# Patient Record
Sex: MALE
[De-identification: names, ages, dates, MRNs, and addresses within clinical notes are randomized per-mention and may not be internally consistent; named-entity substitution may affect disease eponyms.]

---

## 2018-04-29 ENCOUNTER — HOSPITAL ENCOUNTER (INPATIENT)
Dept: HOSPITAL 31 - C.ER | Age: 53
LOS: 5 days | Discharge: HOME | DRG: 195 | End: 2018-05-04
Attending: INTERNAL MEDICINE | Admitting: INTERNAL MEDICINE
Payer: COMMERCIAL

## 2018-04-29 VITALS — BODY MASS INDEX: 25.6 KG/M2

## 2018-04-29 DIAGNOSIS — J18.9: Primary | ICD-10-CM

## 2018-04-29 DIAGNOSIS — Z23: ICD-10-CM

## 2018-04-29 LAB
ALBUMIN SERPL-MCNC: 3.7 G/DL (ref 3.5–5)
ALBUMIN/GLOB SERPL: 1.2 {RATIO} (ref 1–2.1)
ALT SERPL-CCNC: 38 U/L (ref 21–72)
AST SERPL-CCNC: 64 U/L (ref 17–59)
BASOPHILS # BLD AUTO: 0 K/UL (ref 0–0.2)
BASOPHILS NFR BLD: 0.5 % (ref 0–2)
BUN SERPL-MCNC: 6 MG/DL (ref 9–20)
CALCIUM SERPL-MCNC: 8.7 MG/DL (ref 8.6–10.4)
EOSINOPHIL # BLD AUTO: 0 K/UL (ref 0–0.7)
EOSINOPHIL NFR BLD: 0.4 % (ref 0–4)
ERYTHROCYTE [DISTWIDTH] IN BLOOD BY AUTOMATED COUNT: 12.5 % (ref 11.5–14.5)
GFR NON-AFRICAN AMERICAN: > 60
HGB BLD-MCNC: 14.4 G/DL (ref 12–18)
LYMPHOCYTES # BLD AUTO: 1.8 K/UL (ref 1–4.3)
LYMPHOCYTES NFR BLD AUTO: 24.1 % (ref 20–40)
MCH RBC QN AUTO: 31.5 PG (ref 27–31)
MCHC RBC AUTO-ENTMCNC: 35.1 G/DL (ref 33–37)
MCV RBC AUTO: 89.7 FL (ref 80–94)
MONOCYTES # BLD: 0.8 K/UL (ref 0–0.8)
MONOCYTES NFR BLD: 11 % (ref 0–10)
NEUTROPHILS # BLD: 4.9 K/UL (ref 1.8–7)
NEUTROPHILS NFR BLD AUTO: 64 % (ref 50–75)
NRBC BLD AUTO-RTO: 0.1 % (ref 0–2)
PLATELET # BLD: 202 K/UL (ref 130–400)
PMV BLD AUTO: 9.2 FL (ref 7.2–11.7)
RBC # BLD AUTO: 4.58 MIL/UL (ref 4.4–5.9)
WBC # BLD AUTO: 7.6 K/UL (ref 4.8–10.8)

## 2018-04-29 RX ADMIN — METHYLPREDNISOLONE SODIUM SUCCINATE SCH MG: 40 INJECTION, POWDER, FOR SOLUTION INTRAMUSCULAR; INTRAVENOUS at 18:33

## 2018-04-29 RX ADMIN — IPRATROPIUM BROMIDE AND ALBUTEROL SULFATE SCH ML: .5; 3 SOLUTION RESPIRATORY (INHALATION) at 20:53

## 2018-04-29 NOTE — RAD
HISTORY:

Cough and fever 



COMPARISON:

No prior.



TECHNIQUE:

Chest PA and lateral



FINDINGS:



LUNGS:

Bibasilar atelectasis -scarring and/or lower lobe infiltrates



PLEURA:

No significant pleural effusion identified. No pneumothorax apparent.



CARDIOVASCULAR:

Normal.



OSSEOUS STRUCTURES:

No significant abnormalities.



VISUALIZED UPPER ABDOMEN:

Normal.



OTHER FINDINGS:

None.



IMPRESSION:

Bibasilar atelectasis -scarring and/or lower lobe infiltrates

## 2018-04-29 NOTE — CP.PCM.HP
Past Patient History





- Past Social History


Smoking Status: Never Smoked





- PSYCHIATRIC


Hx Substance Use: No





- SURGICAL HISTORY


Hx Surgeries: Yes





Meds


Allergies/Adverse Reactions: 


 Allergies











Allergy/AdvReac Type Severity Reaction Status Date / Time


 


No Known Allergies Allergy   Verified 04/29/18 10:10














Results





- Vital Signs


Recent Vital Signs: 





 Last Vital Signs











Temp  98 F   04/29/18 16:12


 


Pulse  106 H  04/29/18 16:12


 


Resp  20   04/29/18 16:12


 


BP  111/76   04/29/18 16:12


 


Pulse Ox  96   04/29/18 17:17














- Labs


Result Diagrams: 


 04/29/18 11:46





 04/29/18 11:46


Labs: 





 Laboratory Results - last 24 hr











  04/29/18 04/29/18 04/29/18





  11:46 11:46 11:46


 


WBC  7.6  


 


RBC  4.58  


 


Hgb  14.4  


 


Hct  41.1  


 


MCV  89.7  


 


MCH  31.5 H  


 


MCHC  35.1  


 


RDW  12.5  


 


Plt Count  202  


 


MPV  9.2  


 


Neut % (Auto)  64.0  


 


Lymph % (Auto)  24.1  


 


Mono % (Auto)  11.0 H  


 


Eos % (Auto)  0.4  


 


Baso % (Auto)  0.5  


 


Neut # (Auto)  4.9  


 


Lymph # (Auto)  1.8  


 


Mono # (Auto)  0.8  


 


Eos # (Auto)  0.0  


 


Baso # (Auto)  0.0  


 


Sodium   141 


 


Potassium   3.9 


 


Chloride   105 


 


Carbon Dioxide   25 


 


Anion Gap   14 


 


BUN   6 L 


 


Creatinine   0.7 L 


 


Est GFR ( Amer)   > 60 


 


Est GFR (Non-Af Amer)   > 60 


 


Random Glucose   86 


 


Lactic Acid    1.3


 


Calcium   8.7 


 


Total Bilirubin   0.6 


 


AST   64 H 


 


ALT   38 


 


Alkaline Phosphatase   73 


 


Total Protein   6.7 


 


Albumin   3.7 


 


Globulin   3.0 


 


Albumin/Globulin Ratio   1.2 


 


Ur L.pneumophila Ag   














  04/29/18





  12:12


 


WBC 


 


RBC 


 


Hgb 


 


Hct 


 


MCV 


 


MCH 


 


MCHC 


 


RDW 


 


Plt Count 


 


MPV 


 


Neut % (Auto) 


 


Lymph % (Auto) 


 


Mono % (Auto) 


 


Eos % (Auto) 


 


Baso % (Auto) 


 


Neut # (Auto) 


 


Lymph # (Auto) 


 


Mono # (Auto) 


 


Eos # (Auto) 


 


Baso # (Auto) 


 


Sodium 


 


Potassium 


 


Chloride 


 


Carbon Dioxide 


 


Anion Gap 


 


BUN 


 


Creatinine 


 


Est GFR ( Amer) 


 


Est GFR (Non-Af Amer) 


 


Random Glucose 


 


Lactic Acid 


 


Calcium 


 


Total Bilirubin 


 


AST 


 


ALT 


 


Alkaline Phosphatase 


 


Total Protein 


 


Albumin 


 


Globulin 


 


Albumin/Globulin Ratio 


 


Ur L.pneumophila Ag  Negative

## 2018-04-29 NOTE — C.PDOC
History Of Present Illness


51yo male, presents to ED with complaints of persistent coughing for the past 

week with associated diarrhea. He was seen and evaluated at [facility] and given levoquin, tessalon perles. Patient followed up with his PMD 

who changed levoquin to Cippro which the patient has been taking with no relief 

of symptoms. He states he is unable to sleep due to persistent coughing, and 

has decreased appetite as well. he has no other complaitns. 


Time Seen by Provider: 04/29/18 10:26


Chief Complaint (Nursing): Shortness Of Breath


History Per: Patient


History/Exam Limitations: no limitations


Onset/Duration Of Symptoms: Days


Current Symptoms Are (Timing): Still Present


Reports Recently: Seen In ED, Treated By A Physician





Past Medical History


Reviewed: Historical Data, Nursing Documentation, Vital Signs


Vital Signs: 


 Last Vital Signs











Temp  98 F   04/29/18 16:12


 


Pulse  106 H  04/29/18 16:12


 


Resp  20   04/29/18 16:12


 


BP  111/76   04/29/18 16:12


 


Pulse Ox  96   04/29/18 16:12














- Medical History


PMH: No Chronic Diseases


Surgical History: Back Surgery


Family History: States: No Known Family Hx





- Social History


Hx Tobacco Use: No


Hx Alcohol Use: No


Hx Substance Use: No





- Immunization History


Hx Tetanus Toxoid Vaccination: No


Hx Influenza Vaccination: No


Hx Pneumococcal Vaccination: No





Review Of Systems


Except As Marked, All Systems Reviewed And Found Negative.


Constitutional: Negative for: Fever, Chills


Cardiovascular: Negative for: Chest Pain


Respiratory: Positive for: Cough.  Negative for: Shortness of Breath


Gastrointestinal: Positive for: Diarrhea.  Negative for: Nausea, Vomiting, 

Abdominal Pain





Physical Exam





- Physical Exam


Appears: Non-toxic, No Acute Distress


Skin: Normal Color, Warm, Diaphoretic


Head: Atraumatic, Normacephalic


Eye(s): bilateral: Normal Inspection, PERRL


Oral Mucosa: Moist


Throat: Normal, No Erythema


Neck: Normal ROM, Supple


Chest: Symmetrical


Cardiovascular: Rhythm Regular


Respiratory: Other (crackles noted to right lower lobe)


Gastrointestinal/Abdominal: Normal Exam, Soft, No Tenderness


Back: Normal Inspection


Extremity: Normal ROM, No Pedal Edema, No Deformity


Neurological/Psych: Oriented x3





ED Course And Treatment





- Laboratory Results


Result Diagrams: 


 04/29/18 11:46





 04/29/18 11:46


O2 Sat by Pulse Oximetry: 96 (RA)


Pulse Ox Interpretation: Normal





- Radiology


CXR: Read By Radiologist


CXR Interpretation: Yes: Infiltrates (right lower lobe)





- Other Rad


  ** CXR


X-Ray: Viewed By Me, Read By Radiologist


Interpretation: Accession No. : [id number].  Patient Name / ID : ELLIE SIN S  / [id number].  Exam Date : 04/29/2018 10:29:01 ( Approved ).  Study 

Comment :  Sex / Age : M  / 52Y.  Creator : Mike Kate MD.  Dictator :

   :  Approver : Mike Kate MD.  Approver2 :  Report Date : 

04/29/2018 10:47:51.  My Comment :  ********************************************

***************************************.  HISTORY:  Cough and fever.  COMPARISON

:  No prior.  TECHNIQUE:  Chest PA and lateral.  FINDINGS:  LUNGS:  Bibasilar 

atelectasis -scarring and/or lower lobe infiltrates.  PLEURA:  No significant 

pleural effusion identified. No pneumothorax apparent.  CARDIOVASCULAR:  

Normal.  OSSEOUS STRUCTURES:  No significant abnormalities.  VISUALIZED UPPER 

ABDOMEN:  Normal.  OTHER FINDINGS:  None.  IMPRESSION:  Bibasilar atelectasis -

scarring and/or lower lobe infiltrates


Progress Note: Labs and CXR ordered.  11:20 CXR reviewed, and patient has 

possible penumonia in right lower lobe.  Patient given Albuterol, IV Rocephin, 

Duoneb, Phenergan, Solumedrol and Zithromax.  15:35 Case disucssed with Dr. NENO Mahan who will admit patient for further treatment.





Disposition





- Disposition


Disposition: HOSPITALIZED


Disposition Time: 15:37


Condition: FAIR





- Clinical Impression


Clinical Impression: 


 Pneumonia








- PA / NP / Resident Statement


MD/DO has reviewed & agrees with the documentation as recorded.





- Scribe Statement


The provider has reviewed the documentation as recorded by the Scribe (Naya Maddox)


Provider Attestation:


All medical record entries made by the Scribe were at my direction and 

personally dictated by me. I have reviewed the chart and agree that the record 

accurately reflects my personal performance of the history, physical exam, 

medical decision making, and the department course for this patient. I have 

also personally directed, reviewed, and agree with the discharge instructions 

and disposition.





Decision To Admit





- Pt Status Changed To:


Hospital Disposition Of: Inpatient





- Admit Certification


Admit to Inpatient:: After my assessment, the patient will require 

hospitalization for at least two midnights.  This is because of the severity of 

symptoms shown, intensity of services needed, and/or the medical risk in this 

patient being treated as an outpatient.





- InPatient:


Physician Admission Certification: I certify that this patient requires 2 or 

more midnights of care for the following reason:: needs IV antibiotics, failed 

outpatient treatment





- .


Bed Request Type: Regular


Admitting Physician: Tony Mahan


Patient Diagnosis: 


 Pneumonia

## 2018-04-30 LAB
ALBUMIN SERPL-MCNC: 3.8 G/DL (ref 3.5–5)
ALBUMIN/GLOB SERPL: 1.3 {RATIO} (ref 1–2.1)
ALT SERPL-CCNC: 36 U/L (ref 21–72)
AST SERPL-CCNC: 53 U/L (ref 17–59)
BASOPHILS # BLD AUTO: 0 K/UL (ref 0–0.2)
BASOPHILS NFR BLD: 0.2 % (ref 0–2)
BUN SERPL-MCNC: 12 MG/DL (ref 9–20)
CALCIUM SERPL-MCNC: 9.1 MG/DL (ref 8.6–10.4)
EOSINOPHIL # BLD AUTO: 0 K/UL (ref 0–0.7)
EOSINOPHIL NFR BLD: 0 % (ref 0–4)
ERYTHROCYTE [DISTWIDTH] IN BLOOD BY AUTOMATED COUNT: 12.7 % (ref 11.5–14.5)
GFR NON-AFRICAN AMERICAN: > 60
HGB BLD-MCNC: 13.7 G/DL (ref 12–18)
LYMPHOCYTES # BLD AUTO: 0.7 K/UL (ref 1–4.3)
LYMPHOCYTES NFR BLD AUTO: 13.2 % (ref 20–40)
MCH RBC QN AUTO: 31.9 PG (ref 27–31)
MCHC RBC AUTO-ENTMCNC: 35.8 G/DL (ref 33–37)
MCV RBC AUTO: 89.1 FL (ref 80–94)
MONOCYTES # BLD: 0.7 K/UL (ref 0–0.8)
MONOCYTES NFR BLD: 12.4 % (ref 0–10)
NEUTROPHILS # BLD: 4 K/UL (ref 1.8–7)
NEUTROPHILS NFR BLD AUTO: 74.2 % (ref 50–75)
NRBC BLD AUTO-RTO: 0.1 % (ref 0–2)
PLATELET # BLD: 234 K/UL (ref 130–400)
PMV BLD AUTO: 9 FL (ref 7.2–11.7)
RBC # BLD AUTO: 4.3 MIL/UL (ref 4.4–5.9)
WBC # BLD AUTO: 5.4 K/UL (ref 4.8–10.8)

## 2018-04-30 RX ADMIN — METHYLPREDNISOLONE SODIUM SUCCINATE SCH MG: 40 INJECTION, POWDER, FOR SOLUTION INTRAMUSCULAR; INTRAVENOUS at 13:41

## 2018-04-30 RX ADMIN — IPRATROPIUM BROMIDE AND ALBUTEROL SULFATE SCH ML: .5; 3 SOLUTION RESPIRATORY (INHALATION) at 20:06

## 2018-04-30 RX ADMIN — METHYLPREDNISOLONE SODIUM SUCCINATE SCH MG: 40 INJECTION, POWDER, FOR SOLUTION INTRAMUSCULAR; INTRAVENOUS at 05:27

## 2018-04-30 RX ADMIN — IPRATROPIUM BROMIDE AND ALBUTEROL SULFATE SCH ML: .5; 3 SOLUTION RESPIRATORY (INHALATION) at 08:04

## 2018-04-30 RX ADMIN — IPRATROPIUM BROMIDE AND ALBUTEROL SULFATE SCH ML: .5; 3 SOLUTION RESPIRATORY (INHALATION) at 01:12

## 2018-04-30 RX ADMIN — METHYLPREDNISOLONE SODIUM SUCCINATE SCH MG: 40 INJECTION, POWDER, FOR SOLUTION INTRAMUSCULAR; INTRAVENOUS at 21:15

## 2018-04-30 RX ADMIN — IPRATROPIUM BROMIDE AND ALBUTEROL SULFATE SCH: .5; 3 SOLUTION RESPIRATORY (INHALATION) at 13:47

## 2018-04-30 RX ADMIN — ENOXAPARIN SODIUM SCH MG: 40 INJECTION SUBCUTANEOUS at 09:37

## 2018-04-30 RX ADMIN — CEFTRIAXONE SCH MLS/HR: 1 INJECTION, SOLUTION INTRAVENOUS at 09:37

## 2018-04-30 NOTE — CP.PCM.PN
Subjective





- Date & Time of Evaluation


Date of Evaluation: 04/30/18


Time of Evaluation: 09:20





- Subjective


Subjective: 


clinically same





Objective





- Vital Signs/Intake and Output


Vital Signs (last 24 hours): 


 











Temp Pulse Resp BP Pulse Ox


 


 98.3 F   95 H  18   104/68   98 


 


 04/30/18 15:00  04/30/18 15:00  04/30/18 15:00  04/30/18 15:00  04/30/18 15:00








Intake and Output: 


 











 04/30/18 04/30/18





 06:59 18:59


 


Intake Total 0 960


 


Balance 0 960














- Medications


Medications: 


 Current Medications





Albuterol/Ipratropium (Duoneb 3 Mg/0.5 Mg (3 Ml) Ud)  3 ml INH RQ6 [facility]


   Last Admin: 04/30/18 13:47 Dose:  Not Given


Benzonatate (Tessalon Perles)  200 mg PO BID [facility]


   Last Admin: 04/30/18 09:38 Dose:  200 mg


Enoxaparin Sodium (Lovenox)  40 mg SC DAILY [facility]


   Last Admin: 04/30/18 09:37 Dose:  40 mg


Ceftriaxone Sodium (Rocephin Iv 1 Gm Duplex)  50 mls @ 100 mls/hr IVPB DAILY [facility]


   PRN Reason: Protocol


   Stop: 05/10/18 10:01


   Last Admin: 04/30/18 09:37 Dose:  100 mls/hr


Azithromycin 500 mg/ Sodium (Chloride)  250 mls @ 167 mls/hr IVPB DAILY [facility]


   PRN Reason: Protocol


   Last Admin: 04/30/18 11:25 Dose:  167 mls/hr


Methylprednisolone (Solu-Medrol)  40 mg IVP Q12 [facility]


Pneumococcal Polyvalent Vaccine (Pneumovax 23 Vaccine)  0.5 ml IM .ONCE ONE


   Stop: 05/01/18 10:01











- Labs


Labs: 


 





 04/30/18 11:41 





 04/30/18 11:41 











- Constitutional


Appears: Well





- Head Exam


Head Exam: ATRAUMATIC, NORMAL INSPECTION, NORMOCEPHALIC





- Eye Exam


Eye Exam: EOMI, Normal appearance, PERRL


Pupil Exam: NORMAL ACCOMODATION, PERRL





- ENT Exam


ENT Exam: Mucous Membranes Moist, Normal Exam





- Neck Exam


Neck Exam: Full ROM, Normal Inspection.  absent: Lymphadenopathy





- Respiratory Exam


Respiratory Exam: Decreased Breath Sounds





- Cardiovascular Exam


Cardiovascular Exam: REGULAR RHYTHM, +S1, +S2





- GI/Abdominal Exam


GI & Abdominal Exam: Soft, Diminished Bowel Sounds





- Rectal Exam


Rectal Exam: Deferred

## 2018-04-30 NOTE — CP.PCM.PN
<Liat Hand - Last Filed: 04/30/18 16:15>





Subjective





- Date & Time of Evaluation


Date of Evaluation: 04/30/18


Time of Evaluation: 16:11





- Subjective


Subjective: 





PGy2 progress note for Dr. Mahan





52 year old male with no past medical history is admitted for pneumonia.  Pt 

seen and examined at bedside.  No acute events overnight.  Patient is resting 

comfortablly currently off the nasal canula.  patient denies having any SOB, CP

, abd pain, N/V/D/C, F/C.  Patient is still c/o cough that's non-productive.    





Objective





- Vital Signs/Intake and Output


Vital Signs (last 24 hours): 


 











Temp Pulse Resp BP Pulse Ox


 


 98.3 F   95 H  18   104/68   98 


 


 04/30/18 15:00  04/30/18 15:00  04/30/18 15:00  04/30/18 15:00  04/30/18 15:00








Intake and Output: 


 











 04/30/18 04/30/18





 06:59 18:59


 


Intake Total 0 960


 


Balance 0 960














- Medications


Medications: 


 Current Medications





Albuterol/Ipratropium (Duoneb 3 Mg/0.5 Mg (3 Ml) Ud)  3 ml INH RQ6 [facility]


   Last Admin: 04/30/18 13:47 Dose:  Not Given


Benzonatate (Tessalon Perles)  200 mg PO BID [facility]


   Last Admin: 04/30/18 09:38 Dose:  200 mg


Enoxaparin Sodium (Lovenox)  40 mg SC DAILY [facility]


   Last Admin: 04/30/18 09:37 Dose:  40 mg


Ceftriaxone Sodium (Rocephin Iv 1 Gm Duplex)  50 mls @ 100 mls/hr IVPB DAILY [facility]


   PRN Reason: Protocol


   Stop: 05/10/18 10:01


   Last Admin: 04/30/18 09:37 Dose:  100 mls/hr


Azithromycin 500 mg/ Sodium (Chloride)  250 mls @ 167 mls/hr IVPB DAILY [facility]


   PRN Reason: Protocol


   Last Admin: 04/30/18 11:25 Dose:  167 mls/hr


Methylprednisolone (Solu-Medrol)  40 mg IVP Q8 [facility]


   Last Admin: 04/30/18 13:41 Dose:  40 mg


Pneumococcal Polyvalent Vaccine (Pneumovax 23 Vaccine)  0.5 ml IM .ONCE ONE


   Stop: 05/01/18 10:01











- Labs


Labs: 


 





 04/30/18 11:41 





 04/30/18 11:41 











- Constitutional


Appears: Non-toxic, No Acute Distress





- Head Exam


Head Exam: ATRAUMATIC





- ENT Exam


ENT Exam: Mucous Membranes Moist





- Respiratory Exam


Respiratory Exam: Rhonchi.  absent: Accessory Muscle Use, Rales, Wheezes, 

Respiratory Distress





- Cardiovascular Exam


Cardiovascular Exam: REGULAR RHYTHM, +S1, +S2.  absent: Gallop, Rubs, Murmur





- GI/Abdominal Exam


GI & Abdominal Exam: Soft, Normal Bowel Sounds.  absent: Distended, Firm, 

Guarding, Rigid, Tenderness, Organomegaly





- Extremities Exam


Extremities Exam: absent: Pedal Edema, Tenderness





- Neurological Exam


Neurological Exam: Alert, Awake, Oriented x3





- Psychiatric Exam


Psychiatric exam: Normal Affect, Normal Mood





- Skin


Skin Exam: Dry, Intact, Normal Color, Warm





Assessment and Plan





- Assessment and Plan (Free Text)


Assessment: 





52 year old male with no significant past medical history is admitted for PNA





PNA


- Chest x ray on admission showed bibasilar atelectasis- scarring and/or lower 

lobe infiltrates 


- Afebrile and normal WBC count on admission


- Legionella negative. Blood cultures negative


- Pt started on rocephin and zithromax


- Continue solumedrol 40 mg IV q12


- Duoneb q6 [facility]


- Tessalon perles prn





Hyperglycemia


- will check Hgba1c and lipid panel





Prophylaxis


- lovenox 


- SCDs





Case discussed with Dr. Mahan.  All orders and management per Dr. Mahan





<Tony Mahan - Last Filed: 05/04/18 11:50>





Objective





- Vital Signs/Intake and Output


Vital Signs (last 24 hours): 


 











Temp Pulse Resp BP Pulse Ox


 


 98.4 F   74   18   108/70   96 


 


 05/04/18 07:25  05/04/18 07:25  05/04/18 07:25  05/04/18 07:25  05/04/18 07:25








Intake and Output: 


 











 05/04/18 05/04/18





 06:59 18:59


 


Intake Total 218 


 


Balance 218 














- Medications


Medications: 


 Current Medications





Albuterol/Ipratropium (Duoneb 3 Mg/0.5 Mg (3 Ml) Ud)  3 ml INH RQ6 [facility]


   Last Admin: 05/04/18 07:35 Dose:  3 ml


Benzonatate (Tessalon Perles)  200 mg PO BID [facility]


   Last Admin: 05/04/18 09:41 Dose:  200 mg


Enoxaparin Sodium (Lovenox)  40 mg SC DAILY [facility]


   Last Admin: 05/04/18 09:40 Dose:  40 mg


Ceftriaxone Sodium (Rocephin Iv 1 Gm Duplex)  50 mls @ 100 mls/hr IVPB DAILY [facility]


   PRN Reason: Protocol


   Stop: 05/10/18 10:01


   Last Admin: 05/04/18 09:41 Dose:  100 mls/hr


Methylprednisolone (Solu-Medrol)  40 mg IVP DAILY [facility]


   Last Admin: 05/04/18 09:41 Dose:  40 mg











- Labs


Labs: 


 





 05/04/18 06:28 





 05/04/18 06:28 











Attending/Attestation





- Attestation


I have personally seen and examined this patient.: Yes


I have fully participated in the care of the patient.: Yes


I have reviewed all pertinent clinical information, including history, physical 

exam and plan: Yes


Notes (Text): 





case seen and d.w staff and resident, concurred with finding and management..

## 2018-05-01 LAB
ALBUMIN SERPL-MCNC: 3.6 G/DL (ref 3.5–5)
ALBUMIN/GLOB SERPL: 1.3 {RATIO} (ref 1–2.1)
ALT SERPL-CCNC: 54 U/L (ref 21–72)
AST SERPL-CCNC: 58 U/L (ref 17–59)
BASOPHILS # BLD AUTO: 0 K/UL (ref 0–0.2)
BASOPHILS NFR BLD: 0.2 % (ref 0–2)
BUN SERPL-MCNC: 14 MG/DL (ref 9–20)
CALCIUM SERPL-MCNC: 8.9 MG/DL (ref 8.6–10.4)
EOSINOPHIL # BLD AUTO: 0 K/UL (ref 0–0.7)
EOSINOPHIL NFR BLD: 0 % (ref 0–4)
ERYTHROCYTE [DISTWIDTH] IN BLOOD BY AUTOMATED COUNT: 12.4 % (ref 11.5–14.5)
GFR NON-AFRICAN AMERICAN: > 60
HDLC SERPL-MCNC: 29 MG/DL (ref 30–70)
HGB BLD-MCNC: 13.3 G/DL (ref 12–18)
LDLC SERPL-MCNC: 72 MG/DL (ref 0–129)
LYMPHOCYTE: 8 % (ref 20–40)
LYMPHOCYTES # BLD AUTO: 1.3 K/UL (ref 1–4.3)
LYMPHOCYTES NFR BLD AUTO: 8.7 % (ref 20–40)
MCH RBC QN AUTO: 31.4 PG (ref 27–31)
MCHC RBC AUTO-ENTMCNC: 35.3 G/DL (ref 33–37)
MCV RBC AUTO: 89.1 FL (ref 80–94)
MONOCYTE: 5 % (ref 0–10)
MONOCYTES # BLD: 1.2 K/UL (ref 0–0.8)
MONOCYTES NFR BLD: 7.8 % (ref 0–10)
NEUTROPHILS # BLD: 12.6 K/UL (ref 1.8–7)
NEUTROPHILS NFR BLD AUTO: 83.3 % (ref 50–75)
NEUTROPHILS NFR BLD AUTO: 87 % (ref 50–75)
NRBC BLD AUTO-RTO: 0 % (ref 0–2)
PLATELET # BLD EST: NORMAL 10*3/UL
PLATELET # BLD: 276 K/UL (ref 130–400)
PMV BLD AUTO: 8.6 FL (ref 7.2–11.7)
RBC # BLD AUTO: 4.23 MIL/UL (ref 4.4–5.9)
RBC MORPH BLD: NORMAL
TOTAL CELLS COUNTED BLD: 100
WBC # BLD AUTO: 15.1 K/UL (ref 4.8–10.8)

## 2018-05-01 RX ADMIN — IPRATROPIUM BROMIDE AND ALBUTEROL SULFATE SCH ML: .5; 3 SOLUTION RESPIRATORY (INHALATION) at 19:47

## 2018-05-01 RX ADMIN — METHYLPREDNISOLONE SODIUM SUCCINATE SCH MG: 40 INJECTION, POWDER, FOR SOLUTION INTRAMUSCULAR; INTRAVENOUS at 09:21

## 2018-05-01 RX ADMIN — IPRATROPIUM BROMIDE AND ALBUTEROL SULFATE SCH ML: .5; 3 SOLUTION RESPIRATORY (INHALATION) at 08:54

## 2018-05-01 RX ADMIN — ENOXAPARIN SODIUM SCH MG: 40 INJECTION SUBCUTANEOUS at 09:21

## 2018-05-01 RX ADMIN — IPRATROPIUM BROMIDE AND ALBUTEROL SULFATE SCH ML: .5; 3 SOLUTION RESPIRATORY (INHALATION) at 01:19

## 2018-05-01 RX ADMIN — IPRATROPIUM BROMIDE AND ALBUTEROL SULFATE SCH ML: .5; 3 SOLUTION RESPIRATORY (INHALATION) at 13:08

## 2018-05-01 RX ADMIN — METHYLPREDNISOLONE SODIUM SUCCINATE SCH MG: 40 INJECTION, POWDER, FOR SOLUTION INTRAMUSCULAR; INTRAVENOUS at 21:20

## 2018-05-01 RX ADMIN — CEFTRIAXONE SCH MLS/HR: 1 INJECTION, SOLUTION INTRAVENOUS at 09:20

## 2018-05-01 NOTE — CP.PCM.PN
Subjective





- Date & Time of Evaluation


Date of Evaluation: 05/01/18


Time of Evaluation: 09:00





- Subjective


Subjective: 


clinically same





Objective





- Vital Signs/Intake and Output


Vital Signs (last 24 hours): 


 











Temp Pulse Resp BP Pulse Ox


 


 98.1 F   89   20   108/73   94 L


 


 05/01/18 08:18  05/01/18 08:18  05/01/18 08:18  05/01/18 08:18  05/01/18 08:18








Intake and Output: 


 











 05/01/18 05/01/18





 06:59 18:59


 


Intake Total 100 920


 


Balance 100 920














- Medications


Medications: 


 Current Medications





Albuterol/Ipratropium (Duoneb 3 Mg/0.5 Mg (3 Ml) Ud)  [facility]


   Last Admin: 05/01/18 13:08 Dose:  3 ml


Benzonatate (Tessalon Perles)  200 [facility] BID [facility]


   Last Admin: 05/01/18 09:23 Dose:  200 mg


Enoxaparin Sodium (Lovenox)  40 mg SC [facility]ILY [facility]


   Last Admin: 05/01/18 09:21 Dose:  40 mg


Ceftriaxone Sodium (Rocephin Iv 1 Gm Duplex)  50 mls @ 100 mls/hr IVPB [facility]


   PRN Reason: Protocol


   Stop: 05/10/18 10:01


   Last Admin: 05/01/18 09:20 Dose:  100 mls/hr


Azithromycin 500 mg/ Sodium (Chloride)  250 mls @ 167 mls/hr IVPB [facility]ILY [facility]


   PRN Reason: Protocol


   Last Admin: 05/01/18 10:17 Dose:  167 mls/hr


Methylprednisolone (Solu-Medrol)  40 mg IVP Q12 [facility]


   Last Admin: 05/01/18 09:21 Dose:  40 mg











- Labs


Labs: 


 





 05/01/18 07:56 





 05/01/18 07:56 











- Constitutional


Appears: Well





- Head Exam


Head Exam: ATRAUMATIC, NORMAL INSPECTION, NORMOCEPHALIC





- Eye Exam


Eye Exam: EOMI, Normal appearance, PERRL


Pupil Exam: NORMAL ACCOMODATION, PERRL





- ENT Exam


ENT Exam: Mucous Membranes Moist, Normal Exam





- Neck Exam


Neck Exam: Full ROM, Normal Inspection.  absent: Lymphadenopathy





- Respiratory Exam


Respiratory Exam: Decreased Breath Sounds





- Cardiovascular Exam


Cardiovascular Exam: REGULAR RHYTHM, +S1, +S2





- GI/Abdominal Exam


GI & Abdominal Exam: Soft, Diminished Bowel Sounds





- Rectal Exam


Rectal Exam: Deferred

## 2018-05-02 RX ADMIN — METHYLPREDNISOLONE SODIUM SUCCINATE SCH MG: 40 INJECTION, POWDER, FOR SOLUTION INTRAMUSCULAR; INTRAVENOUS at 09:32

## 2018-05-02 RX ADMIN — IPRATROPIUM BROMIDE AND ALBUTEROL SULFATE SCH ML: .5; 3 SOLUTION RESPIRATORY (INHALATION) at 07:25

## 2018-05-02 RX ADMIN — ENOXAPARIN SODIUM SCH MG: 40 INJECTION SUBCUTANEOUS at 09:33

## 2018-05-02 RX ADMIN — CEFTRIAXONE SCH MLS/HR: 1 INJECTION, SOLUTION INTRAVENOUS at 09:33

## 2018-05-02 RX ADMIN — IPRATROPIUM BROMIDE AND ALBUTEROL SULFATE SCH ML: .5; 3 SOLUTION RESPIRATORY (INHALATION) at 01:23

## 2018-05-02 RX ADMIN — IPRATROPIUM BROMIDE AND ALBUTEROL SULFATE SCH ML: .5; 3 SOLUTION RESPIRATORY (INHALATION) at 13:59

## 2018-05-02 RX ADMIN — IPRATROPIUM BROMIDE AND ALBUTEROL SULFATE SCH ML: .5; 3 SOLUTION RESPIRATORY (INHALATION) at 19:45

## 2018-05-02 RX ADMIN — METHYLPREDNISOLONE SODIUM SUCCINATE SCH MG: 40 INJECTION, POWDER, FOR SOLUTION INTRAMUSCULAR; INTRAVENOUS at 21:13

## 2018-05-02 NOTE — CARD
--------------- APPROVED REPORT --------------





EKG Measurement

Heart [id number]

LA [id number]

[id number]

[id number]

[id number]



<Conclusion>

*** Poor data quality, interpretation may be adversely affected

Normal sinus rhythm

Normal ECG

## 2018-05-02 NOTE — CP.PCM.PN
Subjective





- Date & Time of Evaluation


Date of Evaluation: 05/02/18


Time of Evaluation: 09:40





- Subjective


Subjective: 


clinically same





Objective





- Vital Signs/Intake and Output


Vital Signs (last 24 hours): 


 











Temp Pulse Resp BP Pulse Ox


 


 98.4 F   105 H  20   112/72   95 


 


 05/02/18 07:00  05/02/18 07:00  05/02/18 07:00  05/02/18 07:00  05/02/18 07:00








Intake and Output: 


 











 05/02/18 05/02/18





 06:59 18:59


 


Intake Total 100 600


 


Balance 100 600














- Medications


Medications: 


 Current Medications





Albuterol/Ipratropium (Duoneb 3 Mg/0.5 Mg (3 Ml) Ud)  3 ml INH RQ6 [facility]


   Last Admin: 05/02/18 13:59 Dose:  3 ml


Benzonatate (Tessalon Perles)  200 mg PO BID [facility]


   Last Admin: 05/02/18 09:33 Dose:  200 mg


Enoxaparin Sodium (Lovenox)  40 mg SC DAILY [facility]


   Last Admin: 05/02/18 09:33 Dose:  40 mg


Ceftriaxone Sodium (Rocephin Iv 1 Gm Duplex)  50 mls @ 100 mls/hr IVPB DAILY [facility]


   PRN Reason: Protocol


   Stop: 05/10/18 10:01


   Last Admin: 05/02/18 09:33 Dose:  100 mls/hr


Azithromycin 500 mg/ Sodium (Chloride)  250 mls @ 167 mls/hr IVPB DAILY [facility]


   PRN Reason: Protocol


   Last Admin: 05/02/18 10:50 Dose:  167 mls/hr


Methylprednisolone (Solu-Medrol)  40 mg IVP Q12 [facility]


   Last Admin: 05/02/18 09:32 Dose:  40 mg











- Labs


Labs: 


 





 05/01/18 07:56 





 05/01/18 07:56 











- Constitutional


Appears: Well





- Head Exam


Head Exam: ATRAUMATIC, NORMAL INSPECTION, NORMOCEPHALIC





- Eye Exam


Eye Exam: EOMI, Normal appearance, PERRL


Pupil Exam: NORMAL ACCOMODATION, PERRL





- ENT Exam


ENT Exam: Mucous Membranes Moist, Normal Exam





- Neck Exam


Neck Exam: Full ROM, Normal Inspection.  absent: Lymphadenopathy





- Respiratory Exam


Respiratory Exam: Decreased Breath Sounds





- Cardiovascular Exam


Cardiovascular Exam: REGULAR RHYTHM, +S1, +S2





- GI/Abdominal Exam


GI & Abdominal Exam: Soft, Diminished Bowel Sounds





- Rectal Exam


Rectal Exam: Deferred

## 2018-05-03 LAB
ALBUMIN SERPL-MCNC: 3.8 G/DL (ref 3.5–5)
ALBUMIN/GLOB SERPL: 1.4 {RATIO} (ref 1–2.1)
ALT SERPL-CCNC: 46 U/L (ref 21–72)
AST SERPL-CCNC: 38 U/L (ref 17–59)
BASOPHILS # BLD AUTO: 0 K/UL (ref 0–0.2)
BASOPHILS NFR BLD: 0.2 % (ref 0–2)
BUN SERPL-MCNC: 16 MG/DL (ref 9–20)
CALCIUM SERPL-MCNC: 8.9 MG/DL (ref 8.6–10.4)
EOSINOPHIL # BLD AUTO: 0 K/UL (ref 0–0.7)
EOSINOPHIL NFR BLD: 0 % (ref 0–4)
ERYTHROCYTE [DISTWIDTH] IN BLOOD BY AUTOMATED COUNT: 12.5 % (ref 11.5–14.5)
GFR NON-AFRICAN AMERICAN: > 60
HGB BLD-MCNC: 13.7 G/DL (ref 12–18)
LYMPHOCYTES # BLD AUTO: 2.3 K/UL (ref 1–4.3)
LYMPHOCYTES NFR BLD AUTO: 10.2 % (ref 20–40)
MCH RBC QN AUTO: 30.7 PG (ref 27–31)
MCHC RBC AUTO-ENTMCNC: 34.2 G/DL (ref 33–37)
MCV RBC AUTO: 89.8 FL (ref 80–94)
MONOCYTES # BLD: 1.1 K/UL (ref 0–0.8)
MONOCYTES NFR BLD: 4.6 % (ref 0–10)
NEUTROPHILS # BLD: 19.3 K/UL (ref 1.8–7)
NEUTROPHILS NFR BLD AUTO: 85 % (ref 50–75)
NRBC BLD AUTO-RTO: 0 % (ref 0–2)
PLATELET # BLD: 410 K/UL (ref 130–400)
PMV BLD AUTO: 8.3 FL (ref 7.2–11.7)
RBC # BLD AUTO: 4.44 MIL/UL (ref 4.4–5.9)
WBC # BLD AUTO: 22.8 K/UL (ref 4.8–10.8)

## 2018-05-03 RX ADMIN — IPRATROPIUM BROMIDE AND ALBUTEROL SULFATE SCH ML: .5; 3 SOLUTION RESPIRATORY (INHALATION) at 08:07

## 2018-05-03 RX ADMIN — METHYLPREDNISOLONE SODIUM SUCCINATE SCH MG: 40 INJECTION, POWDER, FOR SOLUTION INTRAMUSCULAR; INTRAVENOUS at 13:47

## 2018-05-03 RX ADMIN — ENOXAPARIN SODIUM SCH MG: 40 INJECTION SUBCUTANEOUS at 09:34

## 2018-05-03 RX ADMIN — IPRATROPIUM BROMIDE AND ALBUTEROL SULFATE SCH ML: .5; 3 SOLUTION RESPIRATORY (INHALATION) at 01:18

## 2018-05-03 RX ADMIN — CEFTRIAXONE SCH MLS/HR: 1 INJECTION, SOLUTION INTRAVENOUS at 09:36

## 2018-05-03 RX ADMIN — IPRATROPIUM BROMIDE AND ALBUTEROL SULFATE SCH ML: .5; 3 SOLUTION RESPIRATORY (INHALATION) at 13:19

## 2018-05-03 RX ADMIN — IPRATROPIUM BROMIDE AND ALBUTEROL SULFATE SCH ML: .5; 3 SOLUTION RESPIRATORY (INHALATION) at 19:45

## 2018-05-03 NOTE — CP.PCM.PN
<Allyson Younger - Last Filed: 05/03/18 15:19>





Subjective





- Date & Time of Evaluation


Date of Evaluation: 05/03/18


Time of Evaluation: 07:00





- Subjective


Subjective: 








PGy2 progress note for Dr. Mahan





52 year old male with no past medical history is admitted for pneumonia.  Pt 

seen and examined at bedside.  No acute events overnight.  Patient is resting 

comfortably currently off the nasal canula.  patient denies having any SOB, CP, 

abd pain, N/V/D/C, F/C.  Patient is still c/o cough that's non-productive. He 

feels like he is ready to go home.  





Objective





- Vital Signs/Intake and Output


Vital Signs (last 24 hours): 


 











Temp Pulse Resp BP Pulse Ox


 


 98.4 F   81   18   113/76   96 


 


 05/03/18 07:25  05/03/18 07:25  05/03/18 07:25  05/03/18 07:25  05/03/18 07:25











- Medications


Medications: 


 Current Medications





Albuterol/Ipratropium (Duoneb 3 Mg/0.5 Mg (3 Ml) Ud)  3 ml INH RQ6 [facility]


   Last Admin: 05/03/18 08:07 Dose:  3 ml


Benzonatate (Tessalon Perles)  200 mg PO BID [facility]


   Last Admin: 05/03/18 09:38 Dose:  200 mg


Enoxaparin Sodium (Lovenox)  40 mg SC DAILY [facility]


   Last Admin: 05/03/18 09:34 Dose:  40 mg


Ceftriaxone Sodium (Rocephin Iv 1 Gm Duplex)  50 mls @ 100 mls/hr IVPB DAILY [facility]


   PRN Reason: Protocol


   Stop: 05/10/18 10:01


   Last Admin: 05/03/18 09:36 Dose:  100 mls/hr


Methylprednisolone (Solu-Medrol)  40 mg IVP Q12 [facility]


   Last Admin: 05/02/18 21:13 Dose:  40 mg











- Labs


Labs: 


 





 05/03/18 09:38 





 05/03/18 09:38 











- Constitutional


Appears: Non-toxic, No Acute Distress





- Head Exam


Head Exam: ATRAUMATIC, NORMAL INSPECTION





- Eye Exam


Eye Exam: EOMI


Pupil Exam: NORMAL ACCOMODATION





- ENT Exam


ENT Exam: Mucous Membranes Moist





- Respiratory Exam


Respiratory Exam: Decreased Breath Sounds, NORMAL BREATHING PATTERN.  absent: 

Accessory Muscle Use, Wheezes, Respiratory Distress





- Cardiovascular Exam


Cardiovascular Exam: REGULAR RHYTHM, +S1, +S2





- GI/Abdominal Exam


GI & Abdominal Exam: Soft, Normal Bowel Sounds.  absent: Distended, Firm, 

Guarding, Tenderness





- Extremities Exam


Extremities Exam: Normal Inspection





- Back Exam


Back Exam: NORMAL INSPECTION.  absent: CVA tenderness (L), CVA tenderness (R), 

paraspinal tenderness





- Neurological Exam


Neurological Exam: Alert, Awake, CN II-XII Intact, Oriented x3





- Psychiatric Exam


Psychiatric exam: Normal Affect, Normal Mood





- Skin


Skin Exam: Dry, Intact, Normal Color, Warm





Assessment and Plan





- Assessment and Plan (Free Text)


Assessment: 





Assessment: 





52 year old male with no significant past medical history is admitted for PNA





PNA


- Chest x ray on admission showed bibasilar atelectasis- scarring and/or lower 

lobe infiltrates 


- Afebrile and normal WBC count on admission


- Legionella negative. Blood cultures negative


- Pt started on rocephin and zithromax


- Continue solumedrol 40 mg IV q12


- Duoneb q6 [facility]


- Tessalon perles prn


- Blood cultures negatives





Hyperglycemia


- Hgba1c 5.4





Prophylaxis


- lovenox 


- SCDs





Case discussed with Dr. Mahan.  All orders and management per Dr. Mahan





<Tony Mahan - Last Filed: 05/04/18 11:51>





Objective





- Vital Signs/Intake and Output


Vital Signs (last 24 hours): 


 











Temp Pulse Resp BP Pulse Ox


 


 98.4 F   74   18   108/70   96 


 


 05/04/18 07:25  05/04/18 07:25  05/04/18 07:25  05/04/18 07:25  05/04/18 07:25








Intake and Output: 


 











 05/04/18 05/04/18





 06:59 18:59


 


Intake Total 218 


 


Balance 218 














- Medications


Medications: 


 Current Medications





Albuterol/Ipratropium (Duoneb 3 Mg/0.5 Mg (3 Ml) Ud)  3 ml INH RQ6 [facility]


   Last Admin: 05/04/18 07:35 Dose:  3 ml


Benzonatate (Tessalon Perles)  200 mg PO BID [facility]


   Last Admin: 05/04/18 09:41 Dose:  200 mg


Enoxaparin Sodium (Lovenox)  40 mg SC DAILY [facility]


   Last Admin: 05/04/18 09:40 Dose:  40 mg


Ceftriaxone Sodium (Rocephin Iv 1 Gm Duplex)  50 mls @ 100 mls/hr IVPB DAILY [facility]


   PRN Reason: Protocol


   Stop: 05/10/18 10:01


   Last Admin: 05/04/18 09:41 Dose:  100 mls/hr


Methylprednisolone (Solu-Medrol)  40 mg IVP DAILY [facility]


   Last Admin: 05/04/18 09:41 Dose:  40 mg











- Labs


Labs: 


 





 05/04/18 06:28 





 05/04/18 06:28 











Attending/Attestation





- Attestation


I have personally seen and examined this patient.: Yes


I have fully participated in the care of the patient.: Yes


I have reviewed all pertinent clinical information, including history, physical 

exam and plan: Yes


Notes (Text): 





case seen and d.w staff and resident, concurred with finding and management..

## 2018-05-03 NOTE — CP.PCM.PN
Subjective





- Date & Time of Evaluation


Date of Evaluation: 05/03/18


Time of Evaluation: 10:20





- Subjective


Subjective: 


clinically same





Objective





- Vital Signs/Intake and Output


Vital Signs (last 24 hours): 


 











Temp Pulse Resp BP Pulse Ox


 


 98.3 F   92 H  20   113/70   95 


 


 05/03/18 15:00  05/03/18 15:00  05/03/18 15:00  05/03/18 15:00  05/03/18 15:00








Intake and Output: 


 











 05/03/18 05/04/18





 18:59 06:59


 


Intake Total 600 


 


Balance 600 














- Medications


Medications: 


 Current Medications





Albuterol/Ipratropium (Duoneb 3 Mg/0.5 Mg (3 Ml) Ud)  3 ml INH RQ6 [facility]


   Last Admin: 05/03/18 13:19 Dose:  3 ml


Benzonatate (Tessalon Perles)  200 mg PO BID [facility]


   Last Admin: 05/03/18 17:19 Dose:  200 mg


Enoxaparin Sodium (Lovenox)  40 mg SC DAILY [facility]


   Last Admin: 05/03/18 09:34 Dose:  40 mg


Ceftriaxone Sodium (Rocephin Iv 1 Gm Duplex)  50 mls @ 100 mls/hr IVPB DAILY [facility]


   PRN Reason: Protocol


   Stop: 05/10/18 10:01


   Last Admin: 05/03/18 09:36 Dose:  100 mls/hr


Methylprednisolone (Solu-Medrol)  40 mg IVP DAILY [facility]











- Labs


Labs: 


 





 05/03/18 09:38 





 05/03/18 09:38 











- Constitutional


Appears: Well





- Head Exam


Head Exam: ATRAUMATIC, NORMAL INSPECTION, NORMOCEPHALIC





- Eye Exam


Eye Exam: EOMI, Normal appearance, PERRL


Pupil Exam: NORMAL ACCOMODATION, PERRL





- ENT Exam


ENT Exam: Mucous Membranes Moist, Normal Exam





- Neck Exam


Neck Exam: Full ROM, Normal Inspection.  absent: Lymphadenopathy





- Respiratory Exam


Respiratory Exam: Decreased Breath Sounds





- Cardiovascular Exam


Cardiovascular Exam: REGULAR RHYTHM, +S1, +S2





- GI/Abdominal Exam


GI & Abdominal Exam: Soft, Diminished Bowel Sounds





- Rectal Exam


Rectal Exam: Deferred

## 2018-05-04 VITALS
DIASTOLIC BLOOD PRESSURE: 70 MMHG | SYSTOLIC BLOOD PRESSURE: 108 MMHG | TEMPERATURE: 98.4 F | RESPIRATION RATE: 18 BRPM | HEART RATE: 74 BPM

## 2018-05-04 VITALS — OXYGEN SATURATION: 96 %

## 2018-05-04 LAB
ALBUMIN SERPL-MCNC: 3.5 G/DL (ref 3.5–5)
ALBUMIN/GLOB SERPL: 1.2 {RATIO} (ref 1–2.1)
ALT SERPL-CCNC: 44 U/L (ref 21–72)
AST SERPL-CCNC: 30 U/L (ref 17–59)
BASOPHILS # BLD AUTO: 0.1 K/UL (ref 0–0.2)
BASOPHILS NFR BLD: 0.3 % (ref 0–2)
BUN SERPL-MCNC: 14 MG/DL (ref 9–20)
CALCIUM SERPL-MCNC: 8.6 MG/DL (ref 8.6–10.4)
EOSINOPHIL # BLD AUTO: 0 K/UL (ref 0–0.7)
EOSINOPHIL NFR BLD: 0.1 % (ref 0–4)
ERYTHROCYTE [DISTWIDTH] IN BLOOD BY AUTOMATED COUNT: 12.5 % (ref 11.5–14.5)
GFR NON-AFRICAN AMERICAN: > 60
HGB BLD-MCNC: 13.4 G/DL (ref 12–18)
LYMPHOCYTES # BLD AUTO: 3 K/UL (ref 1–4.3)
LYMPHOCYTES NFR BLD AUTO: 12.6 % (ref 20–40)
MCH RBC QN AUTO: 31.2 PG (ref 27–31)
MCHC RBC AUTO-ENTMCNC: 34.9 G/DL (ref 33–37)
MCV RBC AUTO: 89.4 FL (ref 80–94)
MONOCYTES # BLD: 1.5 K/UL (ref 0–0.8)
MONOCYTES NFR BLD: 6.4 % (ref 0–10)
NEUTROPHILS # BLD: 19 K/UL (ref 1.8–7)
NEUTROPHILS NFR BLD AUTO: 80.6 % (ref 50–75)
NRBC BLD AUTO-RTO: 0.2 % (ref 0–2)
PLATELET # BLD: 450 K/UL (ref 130–400)
PMV BLD AUTO: 8 FL (ref 7.2–11.7)
RBC # BLD AUTO: 4.29 MIL/UL (ref 4.4–5.9)
WBC # BLD AUTO: 23.6 K/UL (ref 4.8–10.8)

## 2018-05-04 RX ADMIN — IPRATROPIUM BROMIDE AND ALBUTEROL SULFATE SCH ML: .5; 3 SOLUTION RESPIRATORY (INHALATION) at 01:47

## 2018-05-04 RX ADMIN — IPRATROPIUM BROMIDE AND ALBUTEROL SULFATE SCH ML: .5; 3 SOLUTION RESPIRATORY (INHALATION) at 13:12

## 2018-05-04 RX ADMIN — ENOXAPARIN SODIUM SCH MG: 40 INJECTION SUBCUTANEOUS at 09:40

## 2018-05-04 RX ADMIN — CEFTRIAXONE SCH MLS/HR: 1 INJECTION, SOLUTION INTRAVENOUS at 09:41

## 2018-05-04 RX ADMIN — IPRATROPIUM BROMIDE AND ALBUTEROL SULFATE SCH ML: .5; 3 SOLUTION RESPIRATORY (INHALATION) at 07:35

## 2018-05-04 NOTE — CP.PCM.PN
Subjective





- Date & Time of Evaluation


Date of Evaluation: 05/04/18


Time of Evaluation: 07:00





- Subjective


Subjective: 


clinically same





Objective





- Vital Signs/Intake and Output


Vital Signs (last 24 hours): 


 











Temp Pulse Resp BP Pulse Ox


 


 98.4 F   74   18   108/70   96 


 


 05/04/18 07:25  05/04/18 07:25  05/04/18 07:25  05/04/18 07:25  05/04/18 07:25








Intake and Output: 


 











 05/04/18 05/04/18





 06:59 18:59


 


Intake Total 218 720


 


Balance 218 720














- Medications


Medications: 


 Current Medications





Albuterol/Ipratropium (Duoneb 3 Mg/0.5 Mg (3 Ml) Ud)  3 ml INH RQ6 [facility]


   Last Admin: 05/04/18 13:12 Dose:  3 ml


Benzonatate (Tessalon Perles)  200 mg PO BID [facility]


   Last Admin: 05/04/18 09:41 Dose:  200 mg


Enoxaparin Sodium (Lovenox)  40 mg SC DAILY [facility]


   Last Admin: 05/04/18 09:40 Dose:  40 mg


Ceftriaxone Sodium (Rocephin Iv 1 Gm Duplex)  50 mls @ 100 mls/hr IVPB DAILY [facility]


   PRN Reason: Protocol


   Stop: 05/10/18 10:01


   Last Admin: 05/04/18 09:41 Dose:  100 mls/hr


Methylprednisolone (Solu-Medrol)  40 mg IVP DAILY [facility]


   Last Admin: 05/04/18 09:41 Dose:  40 mg











- Labs


Labs: 


 





 05/04/18 06:28 





 05/04/18 06:28 











- Constitutional


Appears: Well





- Head Exam


Head Exam: ATRAUMATIC, NORMAL INSPECTION, NORMOCEPHALIC





- Eye Exam


Eye Exam: EOMI, Normal appearance, PERRL


Pupil Exam: NORMAL ACCOMODATION, PERRL





- ENT Exam


ENT Exam: Mucous Membranes Moist, Normal Exam





- Neck Exam


Neck Exam: Full ROM, Normal Inspection.  absent: Lymphadenopathy

## 2018-05-04 NOTE — CP.PCM.PN
Subjective





- Date & Time of Evaluation


Date of Evaluation: 05/04/18


Time of Evaluation: 15:35





- Subjective


Subjective: 


PT SEEN BY DR. MORRISON AND CLEARED FOR D/C HOME. RX SENT TO PT'S PHARMACY. ALL D/

C INFORMATION AND MEDS DISCUSSED WITH THE PT AND HE VERBALIZES UNDERSTANDING. 

WORK NOTE GIVEN, HE MAY RETURN ON 5/7/18. NO FURTHER ORDERS.





Objective





- Vital Signs/Intake and Output


Vital Signs (last 24 hours): 


 











Temp Pulse Resp BP Pulse Ox


 


 98.4 F   74   18   108/70   96 


 


 05/04/18 07:25  05/04/18 07:25  05/04/18 07:25  05/04/18 07:25  05/04/18 07:25








Intake and Output: 


 











 05/04/18 05/04/18





 06:59 18:59


 


Intake Total 218 720


 


Balance 218 720














- Medications


Medications: 


 Current Medications





Albuterol/Ipratropium (Duoneb 3 Mg/0.5 Mg (3 Ml) Ud)  3 ml INH RQ6 [facility]


   Last Admin: 05/04/18 13:12 Dose:  3 ml


Benzonatate (Tessalon Perles)  200 mg PO BID [facility]


   Last Admin: 05/04/18 09:41 Dose:  200 mg


Enoxaparin Sodium (Lovenox)  40 mg SC DAILY [facility]


   Last Admin: 05/04/18 09:40 Dose:  40 mg


Ceftriaxone Sodium (Rocephin Iv 1 Gm Duplex)  50 mls @ 100 mls/hr IVPB DAILY [facility]


   PRN Reason: Protocol


   Stop: 05/10/18 10:01


   Last Admin: 05/04/18 09:41 Dose:  100 mls/hr


Methylprednisolone (Solu-Medrol)  40 mg IVP DAILY [facility]


   Last Admin: 05/04/18 09:41 Dose:  40 mg











- Labs


Labs: 


 





 05/04/18 06:28 





 05/04/18 06:28